# Patient Record
Sex: FEMALE | Race: WHITE | URBAN - NONMETROPOLITAN AREA
[De-identification: names, ages, dates, MRNs, and addresses within clinical notes are randomized per-mention and may not be internally consistent; named-entity substitution may affect disease eponyms.]

---
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
## 2025-05-09 ENCOUNTER — PATIENT OUTREACH (OUTPATIENT)
Dept: CASE MANAGEMENT | Age: 20
End: 2025-05-09

## 2025-05-09 NOTE — PROCEDURES
The office order for PCP removal request is Approved and finalized on May 9, 2025.    Removed Krys Celaya MD as the patient's Primary Care Physician
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
## (undated) NOTE — LETTER
Date: 12/4/2018    Patient Name: Ann Tyler          To Whom it may concern: The above patient was seen at the [facility] for treatment of a medical condition.     This patient should be excused from attending school 12/4/2018 see in the o

## (undated) NOTE — LETTER
[address] [facility], [address] [place] [address] Drive  [address] [address] [phone]

## (undated) NOTE — LETTER
[facility] [employer] Child Health Examination       Student's Name  [id number] [facility] Birth Date Title                           Date     Signature HEALTH HISTORY          TO BE COMPLETED AND SIGNED BY PARENT/GUARDIAN AND VERIFIED BY HEALTH CARE PROVIDER    ALLERGIES  (Food, drug, insect, other)  Penicillamine MEDICATION  (List all prescribed or taken on a regular basis.)    Current Outpatient Prescri PHYSICAL EXAMINATION REQUIREMENTS (head circumference if <33 years old):   BP 98/60 (BP Location: Left arm, Patient Position: Sitting, Cuff Size: adult)   Pulse 80   Temp 97.7 °F (36.5 °C) (Temporal)   Resp 16   Ht 62.5\"   Wt 165 lb   BMI 29.70 kg/m² Mouth/Dental Yes  Spinal examination Yes    Cardiovascular/HTN Yes  Nutritional status Yes    Respiratory Yes                   Diagnosis of Asthma: No Mental Health Yes        Currently Prescribed Asthma Medication:            Quick-relief  medication (e.

## (undated) NOTE — LETTER
Date: 1/15/2019    Patient Name: Shalini Lee    To the parent of Al Sanchez, we have been attempting to reach you regarding results on     patient. Please call our office at your earliest convenience [phone].         Sincerely,    The staff of Dr [facility]
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
